# Patient Record
Sex: FEMALE | Race: BLACK OR AFRICAN AMERICAN | Employment: FULL TIME | URBAN - METROPOLITAN AREA
[De-identification: names, ages, dates, MRNs, and addresses within clinical notes are randomized per-mention and may not be internally consistent; named-entity substitution may affect disease eponyms.]

---

## 2024-07-29 ENCOUNTER — HOSPITAL ENCOUNTER (OUTPATIENT)
Facility: HOSPITAL | Age: 49
Discharge: HOME | End: 2024-07-29
Attending: PSYCHIATRY & NEUROLOGY | Admitting: RADIOLOGY
Payer: OTHER GOVERNMENT

## 2024-07-29 VITALS
DIASTOLIC BLOOD PRESSURE: 77 MMHG | BODY MASS INDEX: 29.09 KG/M2 | SYSTOLIC BLOOD PRESSURE: 109 MMHG | OXYGEN SATURATION: 100 % | HEART RATE: 83 BPM | WEIGHT: 174.6 LBS | HEIGHT: 65 IN | TEMPERATURE: 97.5 F | RESPIRATION RATE: 16 BRPM

## 2024-07-29 DIAGNOSIS — R20.2 PARESTHESIA OF SKIN: ICD-10-CM

## 2024-07-29 LAB
APPEARANCE CSF: CLEAR
COLOR CSF: COLORLESS
GLUCOSE CSF-MCNC: 58 MG/DL (ref 40–70)
HCG UR QL: NEGATIVE
PROT CSF-MCNC: 44 MG/DL (ref 15–45)
RBC # CSF: 2 /CU MM
TUBE # CSF: 1
TUBE # CSF: 1
TUBE # CSF: 2
WBC # CSF: 2 /CU MM (ref 0–5)

## 2024-07-29 PROCEDURE — 82784 ASSAY IGA/IGD/IGG/IGM EACH: CPT

## 2024-07-29 PROCEDURE — 84157 ASSAY OF PROTEIN OTHER: CPT

## 2024-07-29 PROCEDURE — 83916 OLIGOCLONAL BANDS: CPT

## 2024-07-29 PROCEDURE — 81025 URINE PREGNANCY TEST: CPT

## 2024-07-29 PROCEDURE — 62328 DX LMBR SPI PNXR W/FLUOR/CT: CPT

## 2024-07-29 PROCEDURE — 87205 SMEAR GRAM STAIN: CPT

## 2024-07-29 PROCEDURE — 2500000003 HC RX 250 WO HCPCS: Performed by: PSYCHIATRY & NEUROLOGY

## 2024-07-29 PROCEDURE — 82042 OTHER SOURCE ALBUMIN QUAN EA: CPT

## 2024-07-29 PROCEDURE — 87070 CULTURE OTHR SPECIMN AEROBIC: CPT

## 2024-07-29 PROCEDURE — 82040 ASSAY OF SERUM ALBUMIN: CPT

## 2024-07-29 PROCEDURE — 89050 BODY FLUID CELL COUNT: CPT

## 2024-07-29 PROCEDURE — 82945 GLUCOSE OTHER FLUID: CPT

## 2024-07-29 RX ORDER — MONTELUKAST SODIUM 10 MG/1
10 TABLET ORAL NIGHTLY
COMMUNITY

## 2024-07-29 RX ORDER — LIDOCAINE HYDROCHLORIDE 10 MG/ML
5 INJECTION, SOLUTION EPIDURAL; INFILTRATION; INTRACAUDAL; PERINEURAL ONCE
Status: COMPLETED | OUTPATIENT
Start: 2024-07-29 | End: 2024-07-29

## 2024-07-29 RX ORDER — CETIRIZINE HYDROCHLORIDE 10 MG/1
10 TABLET ORAL DAILY
COMMUNITY

## 2024-07-29 RX ORDER — VENLAFAXINE 37.5 MG/1
37.5 TABLET ORAL NIGHTLY
COMMUNITY

## 2024-07-29 RX ORDER — AMITRIPTYLINE HYDROCHLORIDE 10 MG/1
5 TABLET, FILM COATED ORAL NIGHTLY
COMMUNITY

## 2024-07-29 RX ADMIN — LIDOCAINE HYDROCHLORIDE 3 ML: 10 INJECTION, SOLUTION EPIDURAL; INFILTRATION; INTRACAUDAL; PERINEURAL at 12:30

## 2024-07-29 NOTE — PROGRESS NOTES
Prepped & ready.   1150 Back from procedure. Band-aid intact to low back. No bleeding or swelling. Denies pain. HOB flat x 2 hours.   1400 Discharge instructions reviewed.   1415 Discharged home via w/c in care of son. Denies pain Band-aid intact & dry to low back.

## 2024-07-29 NOTE — DISCHARGE INSTRUCTIONS
Spinal Tap (Lumbar Puncture): What to Expect at Home  Your Recovery     A spinal tap (also called a lumbar puncture) is a test to check the fluid that surrounds and protects your spinal cord and brain. Your doctor may have done this test to look for an infection. Sometimes it's done to release pressure from too much fluid or to look for diseases such as multiple sclerosis.  You may feel tired, and your back may be sore where the needle went in (the puncture site). You may have a mild headache for a day or two. This can happen when some of the spinal fluid is removed. Drinking extra fluids, taking pain medicine, and lying down for several hours after the procedure may help the headache be less severe. Some people also have trouble sleeping for a day or two.  The fluid taken during a spinal tap is often sent to a lab for tests. Your doctor or nurse will call you with the test results.  This care sheet gives you a general idea about how long it will take for you to recover. But each person recovers at a different pace. Follow the steps below to get better as quickly as possible.  How can you care for yourself at home?  Activity    Your doctor may tell you to lie flat in bed for 1 to 4 hours after the procedure.     Rest when you feel tired. Getting enough sleep will help you recover.     Ask your doctor when you can drive again.   Diet    Drink extra fluids after the procedure to help a headache be less severe.   Medicines    If you stopped taking aspirin or some other blood thinner, your doctor will tell you when to start taking it again.     If you have pain, take pain medicines exactly as directed.  If the doctor gave you a prescription medicine for pain, take it as prescribed.  If you are not taking a prescription pain medicine, ask your doctor if you can take an over-the-counter medicine.     If you think your pain medicine is making you sick to your stomach:  Take your medicine after meals (unless your

## 2024-07-30 LAB
BACTERIA SPEC CULT: NORMAL
GRAM STN SPEC: NORMAL
SERVICE CMNT-IMP: NORMAL

## 2024-08-01 LAB
ALB CSF/SERPL: 5 (ref 0–8)
ALBUMIN CSF-MCNC: 25 MG/DL (ref 8–37)
ALBUMIN SERPL-MCNC: 4.6 G/DL (ref 3.9–4.9)
IGG CSF-MCNC: 8.5 MG/DL (ref 0–6.7)
IGG SERPL-MCNC: 1474 MG/DL (ref 586–1602)
IGG SYNTH RATE SER+CSF CALC-MRATE: 19.1 MG/DAY
IGG/ALB CLEAR SER+CSF-RTO: 1.1 (ref 0–0.7)
IGG/ALB CSF: 0.34 (ref 0–0.25)
OLIGOCLONAL BANDS.IT SER+CSF QL: ABNORMAL

## 2024-08-05 LAB
BACTERIA SPEC CULT: NORMAL
GRAM STN SPEC: NORMAL
SERVICE CMNT-IMP: NORMAL